# Patient Record
(demographics unavailable — no encounter records)

---

## 2024-11-20 NOTE — CONSULT LETTER
[Dear  ___] : Dear  [unfilled], [Consult Letter:] : I had the pleasure of evaluating your patient, [unfilled]. [Consult Closing:] : Thank you very much for allowing me to participate in the care of this patient.  If you have any questions, please do not hesitate to contact me. [Sincerely,] : Sincerely, [FreeTextEntry3] : Phill James MD General Surgery

## 2024-11-20 NOTE — DATA REVIEWED
[FreeTextEntry1] : Patient: VINCENT AVILES YOB: 1968 Phone: [phone] MRN: [id number] Acc: [id number] Date of Exam: 10-   EXAM:  US HERNIA ABDOMINAL WALL HISTORY:  Umbilical hernia  TECHNIQUE:  A real-time ultrasound examination of the anterior abdominal wall musculature was performed COMPARISON:  None  FINDINGS:  In the region of the cholecystectomy scar there is a 2 cm focal hernia which measures 3 cm on Valsalva. There is also an additional hernia measuring up to 1.5 cm in greatest diameter with measuring 1.8 cm on Valsalva. It is nonreducible. It is seen at the level of the umbilicus. No focal collections are seen. IMPRESSION: Evidence of 2 hernias visualized. For better evaluation a CAT scan of the anterior abdominal wall is recommended    Patient: VINCENT AVILES YOB: 1968 Phone: [phone] MRN: [id number] Acc: [id number] Date of Exam: 10-   EXAM:  CT ABDOMEN WITHOUT CONTRAST  Note - This patient has received 0 CT studies and 0 Myocardial Perfusion studies within our network over the previous 12 month period.  HISTORY:  Abdominal wall/ventral hernia.   TECHNIQUE: CT of the abdomen is performed. Contrast Technique: Without. Please note that lack of intravenous contrast limits evaluation of solid abdominal viscera. Oral Contrast: Yes Range/Planes: Domes of the diaphragm to the iliac crest. Axial, coronal, and sagittal.  One or more of the following dose reduction techniques were used: automated exposure control, adjustment of the mA and/or kV according to patient size, use of iterative reconstruction technique.   COMPARISON:  Abdominal wall hernia sonogram from October 7, 2024.    FINDINGS: VISUALIZED PORTION OF CHEST: Unremarkable. FINDINGS: ABDOMEN LIVER: Unremarkable. BILIARY: Cholecystectomy. No significant dilatation of the bile ducts..       PANCREAS: Unremarkable. SPLEEN: Unremarkable. ADRENAL GLANDS: Unremarkable. KIDNEYS: The kidneys are normal in size, shape, and position. There are no renal calculi or hydronephrosis. There are no suspicious renal lesions. There is an exophytic 3 cm cyst of the upper pole of the right kidney which has a benign appearance and does not require further workup.  PROXIMAL URETERS: Unremarkable. VISUALIZED BOWEL: Unremarkable. PERITONEUM: Unremarkable. LYMPH NODES: Unremarkable. VASCULATURE: Unremarkable. SOFT TISSUES: There is a pair of small fat-containing midline supraumbilical ventral hernias. The more cephalad of these is a smaller hernia with the hernia neck measuring 0.7 cm transversely by 0.8 cm craniocaudad. The smaller hernia sac measures 2.4 cm transversely by 1.8 cm AP by 1.4 cm craniocaudad. Just inferior to this is a second midline fat-containing hernia located just superior to the umbilicus. The hernia neck between the rectus muscle measures 4 cm transversely by 1.4 cm craniocaudad. This hernia sac is somewhat larger, measuring 6 cm transversely by 2.2 cm AP by 3.5 cm craniocaudad. There are no other ventral hernias. Specifically, no ventral hernia is seen in the right upper quadrant.  BONES: There is advanced degenerative disc disease at L5-S1.  IMPRESSION:   Two supraumbilical midline fat-containing ventral hernias (the more cephalad is smaller than the more inferior hernia). Cholecystectomy. No ventral hernia in the right upper quadrant.

## 2024-11-20 NOTE — DATA REVIEWED
[FreeTextEntry1] : Patient: VINCENT AVILES YOB: 1968 Phone: [phone] MRN: [id number] Acc: [id number] Date of Exam: 10-   EXAM:  US HERNIA ABDOMINAL WALL HISTORY:  Umbilical hernia  TECHNIQUE:  A real-time ultrasound examination of the anterior abdominal wall musculature was performed COMPARISON:  None  FINDINGS:  In the region of the cholecystectomy scar there is a 2 cm focal hernia which measures 3 cm on Valsalva. There is also an additional hernia measuring up to 1.5 cm in greatest diameter with measuring 1.8 cm on Valsalva. It is nonreducible. It is seen at the level of the umbilicus. No focal collections are seen. IMPRESSION: Evidence of 2 hernias visualized. For better evaluation a CAT scan of the anterior abdominal wall is recommended    Patient: VINCENT AVILES YOB: 1968 Phone: [phone] MRN: [id number] Acc: [id number] Date of Exam: 10-   EXAM:  CT ABDOMEN WITHOUT CONTRAST  Note - This patient has received 0 CT studies and 0 Myocardial Perfusion studies within our network over the previous 12 month period.  HISTORY:  Abdominal wall/ventral hernia.   TECHNIQUE: CT of the abdomen is performed. Contrast Technique: Without. Please note that lack of intravenous contrast limits evaluation of solid abdominal viscera. Oral Contrast: Yes Range/Planes: Domes of the diaphragm to the iliac crest. Axial, coronal, and sagittal.  One or more of the following dose reduction techniques were used: automated exposure control, adjustment of the mA and/or kV according to patient size, use of iterative reconstruction technique.   COMPARISON:  Abdominal wall hernia sonogram from October 7, 2024.    FINDINGS: VISUALIZED PORTION OF CHEST: Unremarkable. FINDINGS: ABDOMEN LIVER: Unremarkable. BILIARY: Cholecystectomy. No significant dilatation of the bile ducts..       PANCREAS: Unremarkable. SPLEEN: Unremarkable. ADRENAL GLANDS: Unremarkable. KIDNEYS: The kidneys are normal in size, shape, and position. There are no renal calculi or hydronephrosis. There are no suspicious renal lesions. There is an exophytic 3 cm cyst of the upper pole of the right kidney which has a benign appearance and does not require further workup.  PROXIMAL URETERS: Unremarkable. VISUALIZED BOWEL: Unremarkable. PERITONEUM: Unremarkable. LYMPH NODES: Unremarkable. VASCULATURE: Unremarkable. SOFT TISSUES: There is a pair of small fat-containing midline supraumbilical ventral hernias. The more cephalad of these is a smaller hernia with the hernia neck measuring 0.7 cm transversely by 0.8 cm craniocaudad. The smaller hernia sac measures 2.4 cm transversely by 1.8 cm AP by 1.4 cm craniocaudad. Just inferior to this is a second midline fat-containing hernia located just superior to the umbilicus. The hernia neck between the rectus muscle measures 4 cm transversely by 1.4 cm craniocaudad. This hernia sac is somewhat larger, measuring 6 cm transversely by 2.2 cm AP by 3.5 cm craniocaudad. There are no other ventral hernias. Specifically, no ventral hernia is seen in the right upper quadrant.  BONES: There is advanced degenerative disc disease at L5-S1.  IMPRESSION:   Two supraumbilical midline fat-containing ventral hernias (the more cephalad is smaller than the more inferior hernia). Cholecystectomy. No ventral hernia in the right upper quadrant.